# Patient Record
Sex: MALE | Race: WHITE | NOT HISPANIC OR LATINO | URBAN - METROPOLITAN AREA
[De-identification: names, ages, dates, MRNs, and addresses within clinical notes are randomized per-mention and may not be internally consistent; named-entity substitution may affect disease eponyms.]

---

## 2023-03-23 ENCOUNTER — OFFICE (OUTPATIENT)
Dept: URBAN - METROPOLITAN AREA CLINIC 72 | Facility: CLINIC | Age: 18
End: 2023-03-23

## 2023-03-23 VITALS
BODY MASS INDEX: 35.36 KG/M2 | HEIGHT: 70 IN | SYSTOLIC BLOOD PRESSURE: 136 MMHG | OXYGEN SATURATION: 99 % | HEART RATE: 75 BPM | WEIGHT: 247 LBS | DIASTOLIC BLOOD PRESSURE: 90 MMHG

## 2023-03-23 DIAGNOSIS — R71.0 PRECIPITOUS DROP IN HEMATOCRIT: ICD-10-CM

## 2023-03-23 DIAGNOSIS — R15.2 FECAL URGENCY: ICD-10-CM

## 2023-03-23 DIAGNOSIS — K92.1 MELENA: ICD-10-CM

## 2023-03-23 DIAGNOSIS — R10.9 UNSPECIFIED ABDOMINAL PAIN: ICD-10-CM

## 2023-03-23 DIAGNOSIS — R63.4 ABNORMAL WEIGHT LOSS: ICD-10-CM

## 2023-03-23 DIAGNOSIS — R74.8 ABNORMAL LEVELS OF OTHER SERUM ENZYMES: ICD-10-CM

## 2023-03-23 PROCEDURE — 99204 OFFICE O/P NEW MOD 45 MIN: CPT | Performed by: NURSE PRACTITIONER

## 2023-03-23 RX ORDER — POLYETHYLENE GLYCOL 3350, SODIUM SULFATE, SODIUM CHLORIDE, POTASSIUM CHLORIDE, ASCORBIC ACID, SODIUM ASCORBATE 140-9-5.2G
KIT ORAL
Qty: 1 | Refills: 0 | Status: COMPLETED
Start: 2023-03-23 | End: 2023-04-20

## 2023-03-23 RX ORDER — DICYCLOMINE HYDROCHLORIDE 10 MG/1
CAPSULE ORAL
Qty: 90 | Refills: 0 | Status: COMPLETED
Start: 2023-03-23 | End: 2024-01-11

## 2023-03-23 NOTE — SERVICEHPINOTES
Josh Treviño   is seen for an initial visit today   for as an initial visit for ER follow up for abdominal pain. He went to the ER on 12/2/2023 with complaints of abdominal pain and passing a blood clot via rectum. 
br
br Today he reports that this morning he has abdominal cramping that woke him up out of his sleep. The pain is generalized and LUQ more painful at times. Since the ER visit, the pain has been occuring daily and will last an hour. The pain will go from mild to intense. He will pain will cause him to go to the bathroom and then he will pass gas and blood. The blood is bright red and he see it when he wipes and also in toilet bowel. At times the blood is mixed in stool. He sees blood daily for past 4 weeks. 
br
br No pain or itching in rectum. br
br His current bowel pattern is 2 soft bowel movements a day.  
br
br 
He has nausea at times. His appetite is decreased and he has lost 10 pounds over the last week. He also reports chills at times and fatigue. 
br
br span id="{[id number]}" class="narrative freetextSelected" type="freetext" canedit="true" suppressed="false" nid="[id number]" gid="{[id number]}" bound="false" visited="true"He denies any vomiting, reflux, regurgitation, dysphagia. 
br
br He has never had a colonoscopy.  Labs 
br 12/1/2023 Fecal Occult- neg. BMP- normal. HFP- Alk Phos 201. Bili 1.4. CBC- Hgb 13.8. 
br br/spanspan id="{[id number]}" class="narrative freetextNormal" type="freetext" canedit="true" suppressed="false" nid="[id number]" gid="{[id number]}" bound="false" visited="true"Procedures 
br 12/1/2022 CT Abd/Pel with contrast- normal 
/span

## 2023-03-23 NOTE — SERVICENOTES
- Labs today and will call with results 
-  Dicyclomine at pharmacy 
- Please call us if you have not received your prep at least 2 weeks prior to colonoscopy or if you have any questions about your prep.

## 2023-04-06 ENCOUNTER — AMBULATORY SURGICAL CENTER (OUTPATIENT)
Dept: URBAN - METROPOLITAN AREA SURGERY 19 | Facility: SURGERY | Age: 18
End: 2023-04-06

## 2023-04-06 ENCOUNTER — OFFICE (OUTPATIENT)
Dept: URBAN - METROPOLITAN AREA PATHOLOGY 10 | Facility: PATHOLOGY | Age: 18
End: 2023-04-06

## 2023-04-06 DIAGNOSIS — R19.7 DIARRHEA, UNSPECIFIED: ICD-10-CM

## 2023-04-06 PROCEDURE — 45380 COLONOSCOPY AND BIOPSY: CPT | Performed by: INTERNAL MEDICINE

## 2023-04-06 PROCEDURE — 88342 IMHCHEM/IMCYTCHM 1ST ANTB: CPT | Performed by: PATHOLOGY

## 2023-04-06 PROCEDURE — 88305 TISSUE EXAM BY PATHOLOGIST: CPT | Performed by: PATHOLOGY

## 2023-04-20 ENCOUNTER — OFFICE (OUTPATIENT)
Dept: URBAN - METROPOLITAN AREA CLINIC 72 | Facility: CLINIC | Age: 18
End: 2023-04-20

## 2023-04-20 VITALS
OXYGEN SATURATION: 99 % | HEART RATE: 82 BPM | HEIGHT: 70 IN | DIASTOLIC BLOOD PRESSURE: 72 MMHG | BODY MASS INDEX: 35.22 KG/M2 | WEIGHT: 246 LBS | SYSTOLIC BLOOD PRESSURE: 122 MMHG

## 2023-04-20 DIAGNOSIS — K51.90 ULCERATIVE COLITIS, UNSPECIFIED, WITHOUT COMPLICATIONS: ICD-10-CM

## 2023-04-20 PROCEDURE — 99214 OFFICE O/P EST MOD 30 MIN: CPT | Performed by: NURSE PRACTITIONER

## 2023-04-20 RX ORDER — ADALIMUMAB 40MG/0.4ML
KIT SUBCUTANEOUS
Qty: 6 | Refills: 3 | Status: ACTIVE
Start: 2023-04-20

## 2023-04-20 NOTE — SERVICENOTES
- Prednisone Taper: 40mg for 2 weeks, then decrease by 1/2 tab (5mg) every week until down to 20mg/day. Then go down by 5mg every other week until off. Should be on taper for 12-13 weeks.
- Continue Calcium while on Prednisone 
- Will start Humira injections, we will be in contact about approval and can assist with first injection pt education 
- Colonoscopy 6 months after starting Humira
- Labs today and will call with results and if need to RTC for Hep B vaccines
- Please go and get Shingrix vaccine  and Pneumonia Vaccine at local pharmacy
- Please start annual Dermatology Visit for skin exam
- We will see you on a routine basis for labs and follow up
- Please read over paperwork given today and call us if any questions about UC and/or treatment plan

## 2023-04-20 NOTE — SERVICEHPINOTES
Josh Treviño   is seen today for follow up after Colonoscopy. 
br
br   br3/23/2023 First Visit: Mariah Treviño is seen for an initial visit today for as an initial visit for ER follow up for abdominal pain. He went to the ER on 12/2/2023 with complaints of abdominal pain and passing a blood clot via rectum. Today he reports that this morning he has abdominal cramping that woke him up out of his sleep. The pain is generalized and LUQ more painful at times. Since the ER visit, the pain has been occuring daily and will last an hour. The pain will go from mild to intense. He will pain will cause him to go to the bathroom and then he will pass gas and blood. The blood is bright red and he see it when he wipes and also in toilet bowel. At times the blood is mixed in stool. He sees blood daily for past 4 weeks.brNo pain or itching in rectum.brHis current bowel pattern is 2 soft bowel movements a day.Burt has nausea at times. His appetite is decreased and he has lost 10 pounds over the last week. He also reports chills at times and fatigue.Burt denies any vomiting, reflux, regurgitation, dysphagia.Burt has never had a colonoscopy. Plan from 3/23/2023: br1. Blood in stool, fecal urgency, weight loss- will move forward with Colonoscopy to further evaluate due to blood in stool for 4 weeks, fatigue, weight loss br2. Abdominal cramping- Dicyclomine prn for cramping br3. Elevated Alk Phos on ER labs in December- will recheck CMP today br4. Decreased Hgb- will recheck CBC and add Iron Studies today Today 4/20/2023brMrIvan Treviño is here today for follow up after recent Colonoscopy revealing Ulcerative Colitis. He has been on Prednisone since Colonoscopy. Today he reports that he was feeling a little better after the Colonoscopy with decreased bloody stool episodes, but over the past 2 days he has been having 4-5 episodes of bloody stool with some abdominal cramping. He was given Calcium/Vit D when he picked up his Prednisone and he has been taking it daily. 
br
br He denies any fever, chills or vomiting. His mother in law is with him today at his visit. Labs br3/23/2023 CBC- eos 0.8. CMP- Alk Phos 168. Iron/Ferritin- normal br12/1/2023 Fecal Occult- neg. BMP- normal. HFP- Alk Phos 201. Bili 1.4. CBC- Hgb 13.8. Imaging br4/6/2023 Abdominal US- normal br12/1/2022 CT Abd/Pel with contrast- normal Procedures br4/6/2023 Colonoscopy with Dr. Jean- active chronic colitis without evidence of granulomas, dysplasia or CMV infection

## 2023-05-17 ENCOUNTER — OFFICE (OUTPATIENT)
Dept: URBAN - METROPOLITAN AREA CLINIC 67 | Facility: CLINIC | Age: 18
End: 2023-05-17
Payer: COMMERCIAL

## 2023-05-17 VITALS — HEIGHT: 70 IN

## 2023-05-17 DIAGNOSIS — K51.00 ULCERATIVE (CHRONIC) PANCOLITIS WITHOUT COMPLICATIONS: ICD-10-CM

## 2023-05-17 PROCEDURE — 99211 OFF/OP EST MAY X REQ PHY/QHP: CPT | Performed by: NURSE PRACTITIONER

## 2023-05-17 NOTE — SERVICENOTES
Patient viewed [employer] injection training video.  Questions were answered.  He correctly self-administered his initial dose (160mg), one pen in each thigh.  No adverse events experienced.  Patient communicated a satisfactory comfort level with self-injection.

## 2024-01-11 ENCOUNTER — OFFICE (OUTPATIENT)
Dept: URBAN - METROPOLITAN AREA CLINIC 72 | Facility: CLINIC | Age: 19
End: 2024-01-11

## 2024-01-11 VITALS
HEART RATE: 78 BPM | WEIGHT: 290 LBS | SYSTOLIC BLOOD PRESSURE: 128 MMHG | DIASTOLIC BLOOD PRESSURE: 78 MMHG | HEIGHT: 70 IN | OXYGEN SATURATION: 98 %

## 2024-01-11 DIAGNOSIS — K51.90 ULCERATIVE COLITIS, UNSPECIFIED, WITHOUT COMPLICATIONS: ICD-10-CM

## 2024-01-11 DIAGNOSIS — Z86.2 PERSONAL HISTORY OF DISEASES OF THE BLOOD AND BLOOD-FORMING: ICD-10-CM

## 2024-01-11 PROCEDURE — 99214 OFFICE O/P EST MOD 30 MIN: CPT | Performed by: NURSE PRACTITIONER

## 2024-01-11 RX ORDER — ADALIMUMAB 40MG/0.4ML
KIT SUBCUTANEOUS
Qty: 6 | Refills: 3 | Status: ACTIVE
Start: 2023-04-20

## 2024-07-18 ENCOUNTER — OFFICE (OUTPATIENT)
Dept: URBAN - METROPOLITAN AREA CLINIC 72 | Facility: CLINIC | Age: 19
End: 2024-07-18

## 2024-07-18 VITALS
SYSTOLIC BLOOD PRESSURE: 124 MMHG | WEIGHT: 305 LBS | HEIGHT: 70 IN | OXYGEN SATURATION: 98 % | HEART RATE: 75 BPM | DIASTOLIC BLOOD PRESSURE: 86 MMHG

## 2024-07-18 DIAGNOSIS — E55.9 VITAMIN D DEFICIENCY, UNSPECIFIED: ICD-10-CM

## 2024-07-18 DIAGNOSIS — Z86.2 PERSONAL HISTORY OF DISEASES OF THE BLOOD AND BLOOD-FORMING: ICD-10-CM

## 2024-07-18 DIAGNOSIS — K51.90 ULCERATIVE COLITIS, UNSPECIFIED, WITHOUT COMPLICATIONS: ICD-10-CM

## 2024-07-18 PROCEDURE — 99213 OFFICE O/P EST LOW 20 MIN: CPT | Performed by: NURSE PRACTITIONER

## 2025-02-26 ENCOUNTER — OFFICE (OUTPATIENT)
Dept: URBAN - METROPOLITAN AREA CLINIC 72 | Facility: CLINIC | Age: 20
End: 2025-02-26
Payer: COMMERCIAL

## 2025-02-26 VITALS
HEART RATE: 88 BPM | HEIGHT: 72 IN | WEIGHT: 314 LBS | SYSTOLIC BLOOD PRESSURE: 128 MMHG | DIASTOLIC BLOOD PRESSURE: 62 MMHG

## 2025-02-26 DIAGNOSIS — E55.9 VITAMIN D DEFICIENCY, UNSPECIFIED: ICD-10-CM

## 2025-02-26 DIAGNOSIS — K51.90 ULCERATIVE COLITIS, UNSPECIFIED, WITHOUT COMPLICATIONS: ICD-10-CM

## 2025-02-26 DIAGNOSIS — Z86.2 PERSONAL HISTORY OF DISEASES OF THE BLOOD AND BLOOD-FORMING: ICD-10-CM

## 2025-02-26 PROCEDURE — 99214 OFFICE O/P EST MOD 30 MIN: CPT | Performed by: NURSE PRACTITIONER

## 2025-03-17 ENCOUNTER — OFFICE (OUTPATIENT)
Dept: URBAN - METROPOLITAN AREA PATHOLOGY 10 | Facility: PATHOLOGY | Age: 20
End: 2025-03-17
Payer: COMMERCIAL

## 2025-03-17 ENCOUNTER — AMBULATORY SURGICAL CENTER (OUTPATIENT)
Dept: URBAN - METROPOLITAN AREA SURGERY 19 | Facility: SURGERY | Age: 20
End: 2025-03-17
Payer: COMMERCIAL

## 2025-03-17 DIAGNOSIS — K51.00 ULCERATIVE (CHRONIC) PANCOLITIS WITHOUT COMPLICATIONS: ICD-10-CM

## 2025-03-17 DIAGNOSIS — D12.5 BENIGN NEOPLASM OF SIGMOID COLON: ICD-10-CM

## 2025-03-17 LAB
COLONOSCOPY STUDY: (no result)
COLONOSCOPY STUDY: (no result)

## 2025-03-17 PROCEDURE — 45380 COLONOSCOPY AND BIOPSY: CPT | Performed by: INTERNAL MEDICINE

## 2025-03-17 PROCEDURE — 88305 TISSUE EXAM BY PATHOLOGIST: CPT | Performed by: STUDENT IN AN ORGANIZED HEALTH CARE EDUCATION/TRAINING PROGRAM
